# Patient Record
Sex: MALE | HISPANIC OR LATINO | URBAN - METROPOLITAN AREA
[De-identification: names, ages, dates, MRNs, and addresses within clinical notes are randomized per-mention and may not be internally consistent; named-entity substitution may affect disease eponyms.]

---

## 2021-11-25 ENCOUNTER — INPATIENT (INPATIENT)
Facility: HOSPITAL | Age: 41
LOS: 3 days | Discharge: HOME | DRG: 552 | End: 2021-11-29
Attending: INTERNAL MEDICINE | Admitting: HOSPITALIST
Payer: SELF-PAY

## 2021-11-25 VITALS
OXYGEN SATURATION: 94 % | RESPIRATION RATE: 18 BRPM | HEART RATE: 92 BPM | SYSTOLIC BLOOD PRESSURE: 167 MMHG | DIASTOLIC BLOOD PRESSURE: 110 MMHG | TEMPERATURE: 98 F

## 2021-11-25 PROCEDURE — 99285 EMERGENCY DEPT VISIT HI MDM: CPT

## 2021-11-25 RX ORDER — KETOROLAC TROMETHAMINE 30 MG/ML
15 SYRINGE (ML) INJECTION ONCE
Refills: 0 | Status: DISCONTINUED | OUTPATIENT
Start: 2021-11-25 | End: 2021-11-25

## 2021-11-25 RX ORDER — MORPHINE SULFATE 50 MG/1
4 CAPSULE, EXTENDED RELEASE ORAL ONCE
Refills: 0 | Status: DISCONTINUED | OUTPATIENT
Start: 2021-11-25 | End: 2021-11-25

## 2021-11-25 RX ORDER — DEXAMETHASONE 0.5 MG/5ML
6 ELIXIR ORAL ONCE
Refills: 0 | Status: COMPLETED | OUTPATIENT
Start: 2021-11-25 | End: 2021-11-25

## 2021-11-25 RX ORDER — DIAZEPAM 5 MG
5 TABLET ORAL ONCE
Refills: 0 | Status: DISCONTINUED | OUTPATIENT
Start: 2021-11-25 | End: 2021-11-25

## 2021-11-25 RX ORDER — METHOCARBAMOL 500 MG/1
1500 TABLET, FILM COATED ORAL ONCE
Refills: 0 | Status: COMPLETED | OUTPATIENT
Start: 2021-11-25 | End: 2021-11-25

## 2021-11-25 RX ORDER — LIDOCAINE 4 G/100G
1 CREAM TOPICAL ONCE
Refills: 0 | Status: COMPLETED | OUTPATIENT
Start: 2021-11-25 | End: 2021-11-25

## 2021-11-25 RX ADMIN — LIDOCAINE 1 PATCH: 4 CREAM TOPICAL at 21:58

## 2021-11-25 RX ADMIN — METHOCARBAMOL 1500 MILLIGRAM(S): 500 TABLET, FILM COATED ORAL at 21:59

## 2021-11-25 RX ADMIN — MORPHINE SULFATE 4 MILLIGRAM(S): 50 CAPSULE, EXTENDED RELEASE ORAL at 21:59

## 2021-11-25 RX ADMIN — Medication 15 MILLIGRAM(S): at 23:42

## 2021-11-25 RX ADMIN — Medication 5 MILLIGRAM(S): at 23:42

## 2021-11-25 RX ADMIN — Medication 6 MILLIGRAM(S): at 21:59

## 2021-11-25 NOTE — ED PROVIDER NOTE - OBJECTIVE STATEMENT
41 y/o male with PMHx of back pain c/o lower back pain. Pt was given 2 doses of 50mg of Fentanyl by EMS prior to arrival. Pt has had pain for 5 days, one the first day pt states pain was mild and then progressed to the point he could not walk due to the pain. Pt states pain radiates down left leg. Pt denies leg weakness. Pt states in the past he has used back brace and medicine in the past for pain that has worked, but has not worked today. Pt to Naproxen earlier today. Pt works as . Pt has not seen a spine doctor in past. No fevers, chills, recent spinal procedures, bowel/bladder incontinence, IVDU, cancer, > 49 yo, recent weight loss, trauma, weakness, numbness, tingling, dysuria, hematuria

## 2021-11-25 NOTE — ED PROVIDER NOTE - PHYSICAL EXAMINATION
Gen: pt in moderate distress secondary to pain   Head: NC/AT  Neck: trachea midline  Card: regular rate and rhythm  Resp:  CTAB  Abd: soft, non-tender  Ext: no deformities  Back: no midline spinal tenderness, + left para-lumbar TTP, + straight leg raise on left  Neuro:  A&O appears non focal  Skin:  Warm and dry as visualized  Psych:  Normal affect and mood

## 2021-11-25 NOTE — ED ADULT NURSE NOTE - OBJECTIVE STATEMENT
Assumed care of patient in b6r. Pt. a&ox4 rr even and unlabored. Pt. bibems, pt reports having back injury in the past. Pt. received 100mcg of fentanyl total en route. Pt. has spine binder from home, pt reports needing to stay flat or else he has excruciating pain. pt educated on plan of care, pt able to successfully teach back plan of care to RN, RN will continue to reeducate pt during hospital stay.

## 2021-11-25 NOTE — ED PROVIDER NOTE - PROGRESS NOTE DETAILS
Jaime SWAN: patient with continued pain despite multiple medications. Will place in CDU for pain control and MRI.

## 2021-11-25 NOTE — ED PROVIDER NOTE - CLINICAL SUMMARY MEDICAL DECISION MAKING FREE TEXT BOX
Pt with acute on chronic low back pain no red flags plan for pain control, reassess. If improved likely discharge with spine center follow-up

## 2021-11-25 NOTE — ED ADULT TRIAGE NOTE - CHIEF COMPLAINT QUOTE
patient with complaints of severe back pain, states that he has a prior back injury, patient received 100mcg total of fentanyl

## 2021-11-26 DIAGNOSIS — M54.50 LOW BACK PAIN, UNSPECIFIED: ICD-10-CM

## 2021-11-26 LAB
ALBUMIN SERPL ELPH-MCNC: 4.4 G/DL — SIGNIFICANT CHANGE UP (ref 3.3–5.2)
ALP SERPL-CCNC: 156 U/L — HIGH (ref 40–120)
ALT FLD-CCNC: 29 U/L — SIGNIFICANT CHANGE UP
ANION GAP SERPL CALC-SCNC: 14 MMOL/L — SIGNIFICANT CHANGE UP (ref 5–17)
ANISOCYTOSIS BLD QL: SLIGHT — SIGNIFICANT CHANGE UP
APPEARANCE UR: ABNORMAL
AST SERPL-CCNC: 28 U/L — SIGNIFICANT CHANGE UP
BACTERIA # UR AUTO: NEGATIVE — SIGNIFICANT CHANGE UP
BASOPHILS # BLD AUTO: 0 K/UL — SIGNIFICANT CHANGE UP (ref 0–0.2)
BASOPHILS NFR BLD AUTO: 0 % — SIGNIFICANT CHANGE UP (ref 0–2)
BILIRUB SERPL-MCNC: 0.6 MG/DL — SIGNIFICANT CHANGE UP (ref 0.4–2)
BILIRUB UR-MCNC: NEGATIVE — SIGNIFICANT CHANGE UP
BUN SERPL-MCNC: 9.6 MG/DL — SIGNIFICANT CHANGE UP (ref 8–20)
CALCIUM SERPL-MCNC: 9.3 MG/DL — SIGNIFICANT CHANGE UP (ref 8.6–10.2)
CHLORIDE SERPL-SCNC: 100 MMOL/L — SIGNIFICANT CHANGE UP (ref 98–107)
CO2 SERPL-SCNC: 22 MMOL/L — SIGNIFICANT CHANGE UP (ref 22–29)
COLOR SPEC: YELLOW — SIGNIFICANT CHANGE UP
CREAT SERPL-MCNC: 0.64 MG/DL — SIGNIFICANT CHANGE UP (ref 0.5–1.3)
DIFF PNL FLD: NEGATIVE — SIGNIFICANT CHANGE UP
EOSINOPHIL # BLD AUTO: 0 K/UL — SIGNIFICANT CHANGE UP (ref 0–0.5)
EOSINOPHIL NFR BLD AUTO: 0 % — SIGNIFICANT CHANGE UP (ref 0–6)
EPI CELLS # UR: SIGNIFICANT CHANGE UP
GIANT PLATELETS BLD QL SMEAR: PRESENT — SIGNIFICANT CHANGE UP
GLUCOSE SERPL-MCNC: 149 MG/DL — HIGH (ref 70–99)
GLUCOSE UR QL: NEGATIVE MG/DL — SIGNIFICANT CHANGE UP
HCT VFR BLD CALC: 44.2 % — SIGNIFICANT CHANGE UP (ref 39–50)
HGB BLD-MCNC: 15.2 G/DL — SIGNIFICANT CHANGE UP (ref 13–17)
KETONES UR-MCNC: ABNORMAL
LEUKOCYTE ESTERASE UR-ACNC: NEGATIVE — SIGNIFICANT CHANGE UP
LYMPHOCYTES # BLD AUTO: 0.45 K/UL — LOW (ref 1–3.3)
LYMPHOCYTES # BLD AUTO: 4.3 % — LOW (ref 13–44)
MANUAL SMEAR VERIFICATION: SIGNIFICANT CHANGE UP
MCHC RBC-ENTMCNC: 29.3 PG — SIGNIFICANT CHANGE UP (ref 27–34)
MCHC RBC-ENTMCNC: 34.4 GM/DL — SIGNIFICANT CHANGE UP (ref 32–36)
MCV RBC AUTO: 85.3 FL — SIGNIFICANT CHANGE UP (ref 80–100)
MICROCYTES BLD QL: SLIGHT — SIGNIFICANT CHANGE UP
MONOCYTES # BLD AUTO: 0 K/UL — SIGNIFICANT CHANGE UP (ref 0–0.9)
MONOCYTES NFR BLD AUTO: 0 % — LOW (ref 2–14)
NEUTROPHILS # BLD AUTO: 9.56 K/UL — HIGH (ref 1.8–7.4)
NEUTROPHILS NFR BLD AUTO: 92.2 % — HIGH (ref 43–77)
NITRITE UR-MCNC: NEGATIVE — SIGNIFICANT CHANGE UP
PH UR: 6.5 — SIGNIFICANT CHANGE UP (ref 5–8)
PLAT MORPH BLD: NORMAL — SIGNIFICANT CHANGE UP
PLATELET # BLD AUTO: 354 K/UL — SIGNIFICANT CHANGE UP (ref 150–400)
POLYCHROMASIA BLD QL SMEAR: SLIGHT — SIGNIFICANT CHANGE UP
POTASSIUM SERPL-MCNC: 4.3 MMOL/L — SIGNIFICANT CHANGE UP (ref 3.5–5.3)
POTASSIUM SERPL-SCNC: 4.3 MMOL/L — SIGNIFICANT CHANGE UP (ref 3.5–5.3)
PROT SERPL-MCNC: 8.4 G/DL — SIGNIFICANT CHANGE UP (ref 6.6–8.7)
PROT UR-MCNC: 30 MG/DL
RBC # BLD: 5.18 M/UL — SIGNIFICANT CHANGE UP (ref 4.2–5.8)
RBC # FLD: 12.8 % — SIGNIFICANT CHANGE UP (ref 10.3–14.5)
RBC BLD AUTO: NORMAL — SIGNIFICANT CHANGE UP
RBC CASTS # UR COMP ASSIST: NEGATIVE /HPF — SIGNIFICANT CHANGE UP (ref 0–4)
SARS-COV-2 RNA SPEC QL NAA+PROBE: SIGNIFICANT CHANGE UP
SODIUM SERPL-SCNC: 136 MMOL/L — SIGNIFICANT CHANGE UP (ref 135–145)
SP GR SPEC: 1.01 — SIGNIFICANT CHANGE UP (ref 1.01–1.02)
UROBILINOGEN FLD QL: 1 MG/DL
VARIANT LYMPHS # BLD: 3.5 % — SIGNIFICANT CHANGE UP (ref 0–6)
WBC # BLD: 10.37 K/UL — SIGNIFICANT CHANGE UP (ref 3.8–10.5)
WBC # FLD AUTO: 10.37 K/UL — SIGNIFICANT CHANGE UP (ref 3.8–10.5)
WBC UR QL: NEGATIVE — SIGNIFICANT CHANGE UP

## 2021-11-26 PROCEDURE — 72131 CT LUMBAR SPINE W/O DYE: CPT | Mod: 26,MA

## 2021-11-26 PROCEDURE — 99234 HOSP IP/OBS SM DT SF/LOW 45: CPT

## 2021-11-26 PROCEDURE — 99222 1ST HOSP IP/OBS MODERATE 55: CPT

## 2021-11-26 PROCEDURE — 72148 MRI LUMBAR SPINE W/O DYE: CPT | Mod: 26

## 2021-11-26 RX ORDER — HYDROMORPHONE HYDROCHLORIDE 2 MG/ML
1 INJECTION INTRAMUSCULAR; INTRAVENOUS; SUBCUTANEOUS ONCE
Refills: 0 | Status: DISCONTINUED | OUTPATIENT
Start: 2021-11-26 | End: 2021-11-26

## 2021-11-26 RX ORDER — LIDOCAINE 4 G/100G
1 CREAM TOPICAL EVERY 24 HOURS
Refills: 0 | Status: DISCONTINUED | OUTPATIENT
Start: 2021-11-26 | End: 2021-11-29

## 2021-11-26 RX ORDER — OXYCODONE AND ACETAMINOPHEN 5; 325 MG/1; MG/1
1 TABLET ORAL EVERY 6 HOURS
Refills: 0 | Status: DISCONTINUED | OUTPATIENT
Start: 2021-11-26 | End: 2021-11-27

## 2021-11-26 RX ORDER — SENNA PLUS 8.6 MG/1
2 TABLET ORAL AT BEDTIME
Refills: 0 | Status: DISCONTINUED | OUTPATIENT
Start: 2021-11-26 | End: 2021-11-29

## 2021-11-26 RX ORDER — METHOCARBAMOL 500 MG/1
1500 TABLET, FILM COATED ORAL EVERY 6 HOURS
Refills: 0 | Status: DISCONTINUED | OUTPATIENT
Start: 2021-11-26 | End: 2021-11-29

## 2021-11-26 RX ORDER — HYDROMORPHONE HYDROCHLORIDE 2 MG/ML
1 INJECTION INTRAMUSCULAR; INTRAVENOUS; SUBCUTANEOUS EVERY 6 HOURS
Refills: 0 | Status: DISCONTINUED | OUTPATIENT
Start: 2021-11-26 | End: 2021-11-28

## 2021-11-26 RX ADMIN — HYDROMORPHONE HYDROCHLORIDE 1 MILLIGRAM(S): 2 INJECTION INTRAMUSCULAR; INTRAVENOUS; SUBCUTANEOUS at 13:45

## 2021-11-26 RX ADMIN — OXYCODONE AND ACETAMINOPHEN 1 TABLET(S): 5; 325 TABLET ORAL at 11:50

## 2021-11-26 RX ADMIN — LIDOCAINE 1 PATCH: 4 CREAM TOPICAL at 11:09

## 2021-11-26 RX ADMIN — METHOCARBAMOL 1500 MILLIGRAM(S): 500 TABLET, FILM COATED ORAL at 17:42

## 2021-11-26 RX ADMIN — LIDOCAINE 1 PATCH: 4 CREAM TOPICAL at 07:30

## 2021-11-26 RX ADMIN — LIDOCAINE 1 PATCH: 4 CREAM TOPICAL at 09:38

## 2021-11-26 RX ADMIN — HYDROMORPHONE HYDROCHLORIDE 1 MILLIGRAM(S): 2 INJECTION INTRAMUSCULAR; INTRAVENOUS; SUBCUTANEOUS at 03:01

## 2021-11-26 RX ADMIN — OXYCODONE AND ACETAMINOPHEN 1 TABLET(S): 5; 325 TABLET ORAL at 11:09

## 2021-11-26 RX ADMIN — METHOCARBAMOL 1500 MILLIGRAM(S): 500 TABLET, FILM COATED ORAL at 11:09

## 2021-11-26 RX ADMIN — HYDROMORPHONE HYDROCHLORIDE 1 MILLIGRAM(S): 2 INJECTION INTRAMUSCULAR; INTRAVENOUS; SUBCUTANEOUS at 13:23

## 2021-11-26 RX ADMIN — OXYCODONE AND ACETAMINOPHEN 1 TABLET(S): 5; 325 TABLET ORAL at 17:41

## 2021-11-26 RX ADMIN — LIDOCAINE 1 PATCH: 4 CREAM TOPICAL at 23:28

## 2021-11-26 NOTE — H&P ADULT - ASSESSMENT
40M with a history of back pain who presented with increased back pain after lifting a heavy object five days prior.    Back pain - CT of the lumbar spine was without acute fracture of traumatic malalignment. Noted mild disc height lossin L3-4 and L5-S1. Noted moderate to severe right and moderate left neuroforaminal stenosis. Various analgesic agents were administered in the emergency department without significant improvement. To continue on analgesic medications and muscle relaxers for now. Addition of stool softener while on opiate medications. No obvious neurological deficits on examination. Pending MRI for further examination. Pain Management consultation placed. Physical Therapy evaluation attempted but the patient was unable to fully participate due to the pain.

## 2021-11-26 NOTE — ED CDU PROVIDER DISPOSITION NOTE - CLINICAL COURSE
Pt is a 40y M presenting with lower back pain after heavy lifting. CT lumbar spine shows spondylosis at L5-S1. Pt placed in OBS for MRI. Pain management called for consult. Pt still with severe back pain and unable to roll over in bed. Will admit for intractable pain.

## 2021-11-26 NOTE — PROVIDER CONTACT NOTE (OTHER) - RECOMMENDATIONS
D/C Recommendation: home, RW? ambulation/transfers TBA D/C Recommendation: homePT, RW? ambulation/transfers TBA

## 2021-11-26 NOTE — ED CDU PROVIDER INITIAL DAY NOTE - ATTENDING CONTRIBUTION TO CARE
Jaime: I performed a face to face bedside interview with patient regarding history of present illness, review of symptoms and past medical history. I completed an independent physical exam.  I have discussed patient's plan of care with advanced care provider.   I agree with note as stated above including HISTORY OF PRESENT ILLNESS, HIV, PAST MEDICAL/SURGICAL/FAMILY/SOCIAL HISTORY, ALLERGIES AND HOME MEDICATIONS, REVIEW OF SYSTEMS, PHYSICAL EXAM, MEDICAL DECISION MAKING and any PROGRESS NOTES during the time I functioned as the attending physician for this patient  unless otherwise noted. My brief assessment is as follows: Patient with back pain x 4-5 days. Continued back pain despite pain meds. Placed in CDU for pain control and MRI. CT showing "Multilevel lumbar spondylosis most prominent at the L5-S1 level contributing to moderate to severe right and moderate left neuroforaminal stenosis.".

## 2021-11-26 NOTE — ED CDU PROVIDER DISPOSITION NOTE - ATTENDING CONTRIBUTION TO CARE
The patient seen and examined    Low Back Pain    I, Elvis Simpson, performed the initial face to face bedside interview with this patient regarding history of present illness, review of symptoms and relevant past medical, social and family history.  I completed an independent physical examination.  I was the initial provider who evaluated this patient. I have signed out the follow up of any pending tests (i.e. labs, radiological studies) to the ACP.  I have communicated the patient’s plan of care and disposition with the ACP.

## 2021-11-26 NOTE — H&P ADULT - NSHPPHYSICALEXAM_GEN_ALL_CORE
Vital Signs Last 24 Hrs  T(C): 36.7 (26 Nov 2021 11:20), Max: 37.1 (25 Nov 2021 23:44)  T(F): 98.1 (26 Nov 2021 11:20), Max: 98.8 (25 Nov 2021 23:44)  HR: 86 (26 Nov 2021 11:20) (86 - 92)  BP: 126/76 (26 Nov 2021 11:20) (126/76 - 167/110)  BP(mean): --  RR: 18 (26 Nov 2021 11:20) (17 - 18)  SpO2: 96% (26 Nov 2021 11:20) (94% - 96%)    General appearance: No acute distress, Awake, Alert  HEENT: Normocephalic, Atraumatic, Conjunctiva clear, EOMI  Neck: Supple, No JVD, No tenderness  Lungs: Breath sound equal bilaterally, No wheezes, No rales  Cardiovascular: S1S2, Regular rhythm  Abdomen: Soft, Nontender, Nondistended, No guarding/rebound, Positive bowel sounds  Extremities: No clubbing, No cyanosis, No edema, No calf tenderness  Neuro: No tremors, Limited examination due to pain, No numbness or decrease in sensation  Psychiatric: Appropriate mood, Normal affect

## 2021-11-26 NOTE — ED ADULT NURSE REASSESSMENT NOTE - NS ED NURSE REASSESS COMMENT FT1
Pt. continues to c/o of lower back pain after pain medication administration. Jaime SWAN at bedside
to CT
patient sleeping; in NAD. will ctm
care assumed from FRANCO Cisse. AAO x 4.no complaints of pain at present. all questions answered. awaiting MRI in AM. will ctm

## 2021-11-26 NOTE — PHYSICAL THERAPY INITIAL EVALUATION ADULT - LEVEL OF INDEPENDENCE, REHAB EVAL
pt refusing to sit on edge of bed due to back pain despite max encouragement, RN and PA aware/maximum assist (25% patients effort)

## 2021-11-26 NOTE — H&P ADULT - HISTORY OF PRESENT ILLNESS
40M who presented with back pain. 40M who presented with back pain. The patient reported that he worked as a  and hurt his back about five days prior while lifting something heavy. The pain was initially mild in severity but continued to worsen everyday and the patient presented to the hospital when the pain was to the point that he was unable to ambulate. He reported a history of back pain in the past but was never to this severity. He would wear a back brace which had previously helped but provided no relief on this occasion. The patient was evaluated in the emergency department and initially placed under observational status. Various analgesic medications were administered without significant improvement and the patient continued to have significant back pain and was thought to require admission to the hospital for further management. The patient reported that he was otherwise in his usual stated of health and was without fevers, chills, cough, or recent sick contacts.

## 2021-11-26 NOTE — PROVIDER CONTACT NOTE (OTHER) - ASSESSMENT
PT ordered. chart reviewed and noted. pt received in ED, semi landrum position in stretcher, agreeable to PT. pt requires assistance for functional mobility due to pain. back pain pre/post session: 10/10, RN and PA aware of pain and function. pt left as received will continue to follow, NAD, all lines intact  goals: 3-5x a week for 1 week  bed mobility: modified I   transfers: modified I   gait: 50 feet RW modified I

## 2021-11-26 NOTE — ED CDU PROVIDER INITIAL DAY NOTE - OBJECTIVE STATEMENT
40 year old male with pmhx of back pain presents c/o back pain. Pt states he has had back pain for "many years", he has always worked with cards, occasional heavy lifting, no initial trauma/injury he can recall. he has flare up occasionally usually controlled with tylenol.  he admits to heavy lifting this past Wednesday, Sunday began having low back pain, radiating down left leg. admits to tingling. he has not been able to walk since. He was given fentanyl 50mg 2x in EMS, decadron, morphine, robaxin, valium here in ED. He still c/o severe back pain. Denies fever/chills, incontinence, recent trauma/injury, n/v/d, abdominal pain, flank pain, sob, chest pain.

## 2021-11-27 LAB
COVID-19 NUCLEOCAPSID GAM AB INTERP: NEGATIVE — SIGNIFICANT CHANGE UP
COVID-19 NUCLEOCAPSID TOTAL GAM ANTIBODY RESULT: 0.3 INDEX — SIGNIFICANT CHANGE UP
COVID-19 SPIKE DOMAIN AB INTERP: POSITIVE
COVID-19 SPIKE DOMAIN ANTIBODY RESULT: >250 U/ML — HIGH
SARS-COV-2 IGG+IGM SERPL QL IA: 0.3 INDEX — SIGNIFICANT CHANGE UP
SARS-COV-2 IGG+IGM SERPL QL IA: >250 U/ML — HIGH
SARS-COV-2 IGG+IGM SERPL QL IA: NEGATIVE — SIGNIFICANT CHANGE UP
SARS-COV-2 IGG+IGM SERPL QL IA: POSITIVE

## 2021-11-27 PROCEDURE — 99252 IP/OBS CONSLTJ NEW/EST SF 35: CPT

## 2021-11-27 PROCEDURE — 99253 IP/OBS CNSLTJ NEW/EST LOW 45: CPT

## 2021-11-27 PROCEDURE — 99233 SBSQ HOSP IP/OBS HIGH 50: CPT

## 2021-11-27 RX ORDER — OXYCODONE HYDROCHLORIDE 5 MG/1
5 TABLET ORAL EVERY 4 HOURS
Refills: 0 | Status: DISCONTINUED | OUTPATIENT
Start: 2021-11-27 | End: 2021-11-29

## 2021-11-27 RX ORDER — ACETAMINOPHEN 500 MG
975 TABLET ORAL EVERY 8 HOURS
Refills: 0 | Status: DISCONTINUED | OUTPATIENT
Start: 2021-11-27 | End: 2021-11-29

## 2021-11-27 RX ORDER — DEXAMETHASONE 0.5 MG/5ML
10 ELIXIR ORAL ONCE
Refills: 0 | Status: COMPLETED | OUTPATIENT
Start: 2021-11-27 | End: 2021-11-27

## 2021-11-27 RX ORDER — HEPARIN SODIUM 5000 [USP'U]/ML
5000 INJECTION INTRAVENOUS; SUBCUTANEOUS EVERY 8 HOURS
Refills: 0 | Status: DISCONTINUED | OUTPATIENT
Start: 2021-11-27 | End: 2021-11-29

## 2021-11-27 RX ADMIN — HYDROMORPHONE HYDROCHLORIDE 1 MILLIGRAM(S): 2 INJECTION INTRAMUSCULAR; INTRAVENOUS; SUBCUTANEOUS at 08:48

## 2021-11-27 RX ADMIN — METHOCARBAMOL 1500 MILLIGRAM(S): 500 TABLET, FILM COATED ORAL at 00:33

## 2021-11-27 RX ADMIN — OXYCODONE AND ACETAMINOPHEN 1 TABLET(S): 5; 325 TABLET ORAL at 05:17

## 2021-11-27 RX ADMIN — OXYCODONE HYDROCHLORIDE 5 MILLIGRAM(S): 5 TABLET ORAL at 23:32

## 2021-11-27 RX ADMIN — OXYCODONE AND ACETAMINOPHEN 1 TABLET(S): 5; 325 TABLET ORAL at 00:33

## 2021-11-27 RX ADMIN — METHOCARBAMOL 1500 MILLIGRAM(S): 500 TABLET, FILM COATED ORAL at 17:25

## 2021-11-27 RX ADMIN — OXYCODONE AND ACETAMINOPHEN 1 TABLET(S): 5; 325 TABLET ORAL at 03:54

## 2021-11-27 RX ADMIN — HYDROMORPHONE HYDROCHLORIDE 1 MILLIGRAM(S): 2 INJECTION INTRAMUSCULAR; INTRAVENOUS; SUBCUTANEOUS at 15:31

## 2021-11-27 RX ADMIN — LIDOCAINE 1 PATCH: 4 CREAM TOPICAL at 11:53

## 2021-11-27 RX ADMIN — Medication 975 MILLIGRAM(S): at 18:07

## 2021-11-27 RX ADMIN — HYDROMORPHONE HYDROCHLORIDE 1 MILLIGRAM(S): 2 INJECTION INTRAMUSCULAR; INTRAVENOUS; SUBCUTANEOUS at 15:50

## 2021-11-27 RX ADMIN — OXYCODONE AND ACETAMINOPHEN 1 TABLET(S): 5; 325 TABLET ORAL at 03:43

## 2021-11-27 RX ADMIN — HEPARIN SODIUM 5000 UNIT(S): 5000 INJECTION INTRAVENOUS; SUBCUTANEOUS at 23:32

## 2021-11-27 RX ADMIN — Medication 10 MILLIGRAM(S): at 18:12

## 2021-11-27 RX ADMIN — SENNA PLUS 2 TABLET(S): 8.6 TABLET ORAL at 23:32

## 2021-11-27 RX ADMIN — HYDROMORPHONE HYDROCHLORIDE 1 MILLIGRAM(S): 2 INJECTION INTRAMUSCULAR; INTRAVENOUS; SUBCUTANEOUS at 03:55

## 2021-11-27 RX ADMIN — METHOCARBAMOL 1500 MILLIGRAM(S): 500 TABLET, FILM COATED ORAL at 11:53

## 2021-11-27 RX ADMIN — OXYCODONE HYDROCHLORIDE 5 MILLIGRAM(S): 5 TABLET ORAL at 17:25

## 2021-11-27 RX ADMIN — HYDROMORPHONE HYDROCHLORIDE 1 MILLIGRAM(S): 2 INJECTION INTRAMUSCULAR; INTRAVENOUS; SUBCUTANEOUS at 09:11

## 2021-11-27 RX ADMIN — HEPARIN SODIUM 5000 UNIT(S): 5000 INJECTION INTRAVENOUS; SUBCUTANEOUS at 15:31

## 2021-11-27 RX ADMIN — LIDOCAINE 1 PATCH: 4 CREAM TOPICAL at 03:43

## 2021-11-27 RX ADMIN — OXYCODONE AND ACETAMINOPHEN 1 TABLET(S): 5; 325 TABLET ORAL at 11:52

## 2021-11-27 RX ADMIN — OXYCODONE AND ACETAMINOPHEN 1 TABLET(S): 5; 325 TABLET ORAL at 12:52

## 2021-11-27 RX ADMIN — HYDROMORPHONE HYDROCHLORIDE 1 MILLIGRAM(S): 2 INJECTION INTRAMUSCULAR; INTRAVENOUS; SUBCUTANEOUS at 02:48

## 2021-11-27 RX ADMIN — METHOCARBAMOL 1500 MILLIGRAM(S): 500 TABLET, FILM COATED ORAL at 23:32

## 2021-11-27 RX ADMIN — METHOCARBAMOL 1500 MILLIGRAM(S): 500 TABLET, FILM COATED ORAL at 05:18

## 2021-11-27 NOTE — CONSULT NOTE ADULT - ASSESSMENT
40M w/ no significant PMH comes in c/o of intractable lower back pain that radiates down his left leg x 5 days prior to admission.  Pt has had back discomfort in the past but never like this.  Pain started after lifting heavy object.  He denies urinary/fecal.    40 year old Upper sorbian speaking Male ( used bedside,[id number]) w/ no PMHX, does not have PCP, presents to ED after worsening back pain, mostly in L lumbar/buttock region, radiating down L posterior aspect of thigh, stopping at knee. While lifting a heavy object ~1 week ago, he started having this back pain. Hx of back pain but unlike current episode, has never seen doctor regarding pain, purchased back brace OTC. Denies bladder/bowel incontinence, saddle anesthesia, headache, nausea, vomiting. Neuro exam intact, however LLE + straight leg raise. Pt preferring prone for comfort.         Plan  - Imaging reviewed  - Pain control PRN; Tylenol 650q6, Oxy 5/10q4  - Decadron 10 x1 for pain comfort  - Pain management  - D/w Dr. Salgado to see in AM for further plan  40 year old [place] speaking Male ( used bedside,[id number]) w/ no PMHX, does not have PCP, presents to ED after worsening back pain, mostly in L lumbar/buttock region, radiating down L posterior aspect of thigh, stopping at knee. While lifting a heavy object ~1 week ago, he started having this back pain. Hx of back pain but unlike current episode, has never seen doctor regarding pain, purchased back brace OTC. Denies bladder/bowel incontinence, saddle anesthesia, headache, nausea, vomiting. Neuro exam intact, however LLE + straight leg raise. Pt preferring prone for comfort.         Plan  - Imaging reviewed  - Q4 neuro checks while in house, monitor for any weakness   - Pain control PRN; Tylenol 650q6, Oxy 5/10q4  - Decadron 10 x1 for pain comfort  - Pain management  - D/w Dr. Salgado to see in AM for further plan

## 2021-11-27 NOTE — PROGRESS NOTE ADULT - ASSESSMENT
39 y/o Marshallese speaking M with no significant PMH comes in c/o of intractable lower back pain that radiates down his left leg x 5 days prior to admission.  Pt has had back discomfort in the past but never like this.  Pain started after lifting heavy object.  He denies urinary/fecal.       Intractable back pain   - CT lumbar spine reviewed and noted above; significant for multilevel lumbar spondylosis most prominent at L5-S1 contributing to mod/severe rt and mod L-neuroforaminal stenosis    - MRI lumbar spine reviewed and noted above; significant for left L4-5 disc herniation w/ underlying degenerative changes and left recess impingement w/ underlying canal stenosis    - PRN analgesics and muscle relaxers   - PRN stool softeners while on opiate regimen   - PT consult will be ordered once Ortho clears and pain is better controlled   - Pain managment following   - In light of MRI findings, Ortho consulted for further recs      VTE ppx: heparin SQ    Dispo: pending clinical course.

## 2021-11-27 NOTE — PROGRESS NOTE ADULT - SUBJECTIVE AND OBJECTIVE BOX
Chief Complaint:  back pain    SUBJECTIVE / OVERNIGHT EVENTS: No acute overnight events reported.  Pt continues to require prn pain meds for pain control.  Pain radiates down lower back to proximal LLE up to knee.  Patient denies paresthesias, fecal/urinary incontinence, inability to move extremities.  He also denies chest pain, SOB, abd pain, N/V, fever, chills, dysuria or any other complaints. All remainder ROS negative.       I&O's Summary        PHYSICAL EXAM:  Vital Signs Last 24 Hrs  T(C): 36.5 (2021 06:41), Max: 36.7 (2021 11:20)  T(F): 97.7 (2021 06:41), Max: 98.1 (2021 11:20)  HR: 78 (2021 06:41) (68 - 86)  BP: 136/86 (2021 06:41) (124/79 - 136/86)  BP(mean): --  RR: 16 (2021 06:41) (16 - 18)  SpO2: 97% (2021 06:41) (92% - 97%)      GENEARAL: pt examined bedside, appears uncomfortable in bed but in NAD  HEENT: NC/AT, moist oral mucosa, clear conjunctiva, sclera nonicteric  RESPIRATORY: Normal respiratory effort; CTA b/l, no wheezing, rhonchi, rales  CARDIOVASCULAR: RRR, normal S1 and S2, no murmur/rub/gallop  ABDOMEN: soft, NT/ND, normoactive bowel sounds, no rebound/guarding  MUSCLOSKELETAL:  no joint swelling or tenderness to palpation, unable to do straight leg test on left 2/2 pain, no spinal or paraspinal tenderness to palpation  EXTREMITIES: No cynaosis, no clubbing, no lower extremity edema; Peripheral pulses are 2+ bilaterally  PSYCH: affect appropriate and cooperative  NEUROLOGY: A+O to person, place, and time, no focal deficits appreciated  SKIN: No rashes or no palpable lesions        LABS:                        15.2   10.37 )-----------( 354      ( 2021 03:11 )             44.2     11-    136  |  100  |  9.6  ----------------------------<  149<H>  4.3   |  22.0  |  0.64    Ca    9.3      2021 03:11    TPro  8.4  /  Alb  4.4  /  TBili  0.6  /  DBili  x   /  AST  28  /  ALT  29  /  AlkPhos  156<H>            Urinalysis Basic - ( 2021 09:56 )    Color: Yellow / Appearance: Slightly Turbid / S.015 / pH: x  Gluc: x / Ketone: Moderate  / Bili: Negative / Urobili: 1 mg/dL   Blood: x / Protein: 30 mg/dL / Nitrite: Negative   Leuk Esterase: Negative / RBC: Negative /HPF / WBC Negative   Sq Epi: x / Non Sq Epi: Occasional / Bacteria: Negative        CAPILLARY BLOOD GLUCOSE            RADIOLOGY & ADDITIONAL TESTS:      < from: CT Lumbar Spine No Cont (21 @ 04:59) >  FINDINGS:    There is no acute lumbar spine fracture or evidence of traumatic malalignment. Preservation of the normal lumbar lordosis. Vertebral body hiatus are well-preserved. Mild intervertebral body disc height lossat L3-L4 and L5-S1. No aggressive osseous lesion. Visualized intra-abdominal contents are unremarkable. Paraspinal musculature is within normal limits.    Evaluation of the individual spinal levels demonstrate:    T12-L1: No disc herniation, spinal canal, or neuroforaminal compromise.    L1-L2: No disc herniation, spinal canal, or neuroforaminal compromise.    L2-L3: No disc herniation, spinal canal, or neuroforaminal compromise.    L3-L4: Diffuse disc bulge with superimposed osseous ridging. Mild left neuroforaminal narrowing. Mild canal stenosis.    L4-L5: Mild diffuse disc bulge. No significant neuroforaminal stenosis. Mild canal stenosis.    L5-S1: Diffuse disc bulge with superimposed osseous ridging contributing to moderate to severe right and moderate left neuroforaminal stenosis.    IMPRESSION:    No acute lumbar spine fracture or evidence of traumatic malalignment.    Multilevel lumbar spondylosis most prominent at the L5-S1 level contributing to moderate to severe right and moderate left neuroforaminal stenosis.    < end of copied text >        < from: MR Lumbar Spine No Cont (21 @ 18:30) >  FINDINGS:  ALIGNMENT:  Mild straightening is noted. Also there is a retrolisthesis at L4-5  VERTEBRAL BODIES:  No acute fracture or destructive lesion is identified.  DISC SPACES: Disc degeneration and disc space narrowing is noted from L3 to S1.  Lower Thoracic: An asymmetric bulge is noted at T11-T12 greater to the left.  L1-2:   Unremarkable  L2-3:   Unremarkable  L3-4:   A small broad-based disc bulge is noted more prevalent to the left. There is mild left foraminal prolapse  L4-5:  A left paracentral and posterolateral herniation is noted with an extruded fragment in the left ventral canal. There is left recess impingement.  L5-S1:   A small protrusion is noted containing within the ventral epidural fat. There is underlying slight retrolisthesis at this level as well.  POSTERIOR ELEMENTS:  Hypertrophic changes are noted at L4-5 and L5-S1  CANAL AND FORAMINA:  Most narrowed at L4-5 left greater than right  INTRADURAL SPACE:  No intradural abnormality.  The distal cord is unremarkable in contour and signal.  MISCELLANEOUS:     The prevertebral and paraspinal soft tissues are unremarkable.    IMPRESSION:      1. Moderate size extrusion indicative of a disc herniation to the left at L4-5 with underlying degenerative changes. Left recess impingement with underlying canal stenosis.  2. Bulging discs also noted at L3-4 and L5-S1 with degenerative changes.  3. No acute fracture identified.    < end of copied text >      MEDICATIONS  (STANDING):  lidocaine   4% Patch 1 Patch Transdermal every 24 hours  methocarbamol 1500 milliGRAM(s) Oral every 6 hours  oxycodone    5 mG/acetaminophen 325 mG 1 Tablet(s) Oral every 6 hours  senna 2 Tablet(s) Oral at bedtime    MEDICATIONS  (PRN):  HYDROmorphone  Injectable 1 milliGRAM(s) IV Push every 6 hours PRN Severe Pain (7 - 10)

## 2021-11-27 NOTE — CONSULT NOTE ADULT - SUBJECTIVE AND OBJECTIVE BOX
Patient is a 40y old  Male who presents with a chief complaint of intractable back pain (2021 09:46)    HPI:  40M who presented with back pain. The patient reported that he worked as a  and hurt his back about five days prior while lifting something heavy. The pain was initially mild in severity but continued to worsen everyday and the patient presented to the hospital when the pain was to the point that he was unable to ambulate. He reported a history of back pain in the past but was never to this severity. He would wear a back brace which had previously helped but provided no relief on this occasion. The patient was evaluated in the emergency department and initially placed under observational status. Various analgesic medications were administered without significant improvement and the patient continued to have significant back pain and was thought to require admission to the hospital for further management. The patient reported that he was otherwise in his usual stated of health and was without fevers, chills, cough, or recent sick contacts. (2021 15:29)      HISTORY OF PRESENT ILLNESS:   40yM PMH     PAST MEDICAL & SURGICAL HISTORY:  Back pain    No significant past surgical history      FAMILY HISTORY:  No pertinent family history in first degree relatives        SOCIAL HISTORY:  Tobacco Use:  EtOH use:   Substance:    Allergies    No Known Allergies    Intolerances        REVIEW OF SYSTEMS  Negative except as noted in HPI  CONSTITUTIONAL: No fever, weight loss, or fatigue  EYES: No eye pain, visual disturbances, or discharge  ENMT:  No difficulty hearing, tinnitus, vertigo; No sinus or throat pain  NECK: No pain or stiffness  BREASTS: No pain, masses, or nipple discharge  RESPIRATORY: No cough, wheezing, chills or hemoptysis; No shortness of breath  CARDIOVASCULAR: No chest pain, palpitations, dizziness, or leg swelling  GASTROINTESTINAL: No abdominal or epigastric pain. No nausea, vomiting, or hematemesis; No diarrhea or constipation. No melena or hematochezia.  GENITOURINARY: No dysuria, frequency, hematuria, or incontinence  NEUROLOGICAL: No headaches, memory loss, loss of strength, numbness, or tremors  SKIN: No itching, burning, rashes, or lesions   LYMPH NODES: No enlarged glands  ENDOCRINE: No heat or cold intolerance; No hair loss  MUSCULOSKELETAL: No joint pain or swelling; No muscle, back, or extremity pain  PSYCHIATRIC: No depression, anxiety, mood swings, or difficulty sleeping  HEME/LYMPH: No easy bruising, or bleeding gums  ALLERY AND IMMUNOLOGIC: No hives or eczema    HOME MEDICATIONS:  Home Medications:    MEDICATIONS:  Antibiotics:    Neuro:  HYDROmorphone  Injectable 1 milliGRAM(s) IV Push every 6 hours PRN  methocarbamol 1500 milliGRAM(s) Oral every 6 hours  oxycodone    5 mG/acetaminophen 325 mG 1 Tablet(s) Oral every 6 hours    Anticoagulation:  heparin   Injectable 5000 Unit(s) SubCutaneous every 8 hours    OTHER:  lidocaine   4% Patch 1 Patch Transdermal every 24 hours  senna 2 Tablet(s) Oral at bedtime    IVF:      Vital Signs Last 24 Hrs  T(C): 36.5 (2021 06:41), Max: 36.7 (2021 11:20)  T(F): 97.7 (2021 06:41), Max: 98.1 (2021 11:20)  HR: 78 (2021 06:41) (68 - 86)  BP: 136/86 (2021 06:41) (124/79 - 136/86)  BP(mean): --  RR: 16 (2021 06:41) (16 - 18)  SpO2: 97% (2021 06:41) (92% - 97%)      PHYSICAL EXAM:  GENERAL: NAD, well-groomed, well-developed  HEAD:  Atraumatic, normocephalic  DRAINS:   WOUND: Dressing clean dry intact; well healed  SHUNT: easily compressible and refills  EYES: Conjunctiva and sclera clear; corneal reflex intact  ENMT: No tonsillar erythema, exudates, or enlargement; moist mucous membranes, good dentition, no lesions  NECK: Supple, no JVD, dormal thyroid  DANNI COMA SCORE: E- V- M- =       E: 4= opens eyes spontaneously 3= to voice 2= to noxious 1= no opening       V: 5= oriented 4= confused 3= inappropriate words 2= incomprehensible sounds 1= nonverbal 1T= intubated       M: 6= follows commands 5= localizes 4= withdraws 3= flexor posturing 2= extensor posturing 1= no movement  MENTAL STATUS: AAO x3; Awake/Comatose; Opens eyes spontaneously/to voice/to light touch/to noxious stimuli; Appropriately conversant without aphasia/Nonverbal; following simple commands/mimicking/not following commands  CRANIAL NERVES: Visual acuity normal for age, visual fields full to confrontation, PERRL. EOMI without nystagmus. Facial sensation intact V1-3 distribution b/l. Face symmetric w/ normal eye closure and smile, tongue midline. Hearing grossly intact. Speech clear. Head turning and shoulder shrug intact.   REFLEXES: PERRL. Corneals intact b/l. Gag intact. Cough intact. Oculocephalic reflex intact (Doll's eye). Negative Bella's b/l. Negative clonus b/l  MOTOR: strength 5/5 b/l upper and lower extremities  Uppers     Delt (C5/6)     Bicep (C5/6)     Wrist Extend (C6)     Tricep (C7)     HG (C8/T1)  R                     5/5                 5/5                         5/5                           5/5                   5/5  L                      5/5                 5/5                         5/5                           5/5                   5/5  Lowers      HF(L1/L2)     KE (L3)     DF (L4)     EHL (L5)     PF (S1)      R                     5/5              5/5           5/5           5/5            5/5  L                     5/5               5/5          5/5            5/5            5/5  SENSATION: grossly intact to light touch all extremities  COORDINATION: Gait intact; rapid alternating movements intact b/l upper extremities; no upper extremity dysmetria  MUSCLE STRETCH REFLEXES: DTRs 2+ intact and symmetric  PLANTAR: upgoing/downgoing/mute (Babinski)  CHEST/LUNG: Clear to auscultation bilaterally; no rales, rhonchi, wheezing, or rubs  HEART: +S1/+S2; Regular rate and rhythm; no murmurs, rubs, or gallops  ABDOMEN: Soft, nontender, nondistended; bowel sounds present all four quadrants  EXTREMITIES:  2+ peripheral pulses, no clubbing, cyanosis, or edema  LYMPH: No lymphadenopathy noted  SKIN: Warm, dry; no rashes or lesions    LABS:             15.2   10.37 )-----------( 354      ( 2021 03:11 )             44.2         136  |  100  |  9.6  ----------------------------<  149<H>  4.3   |  22.0  |  0.64    Ca    9.3      2021 03:11    TPro  8.4  /  Alb  4.4  /  TBili  0.6  /  DBili  x   /  AST  28  /  ALT  29  /  AlkPhos  156<H>  -    Urinalysis Basic - ( 2021 09:56 )    Color: Yellow / Appearance: Slightly Turbid / S.015 / pH: x  Gluc: x / Ketone: Moderate  / Bili: Negative / Urobili: 1 mg/dL   Blood: x / Protein: 30 mg/dL / Nitrite: Negative   Leuk Esterase: Negative / RBC: Negative /HPF / WBC Negative   Sq Epi: x / Non Sq Epi: Occasional / Bacteria: Negative      CULTURES:        RADIOLOGY & ADDITIONAL STUDIES:  CT Lumbar Spine No Cont (21 @ 04:59)   IMPRESSION:  No acute lumbar spine fracture or evidence of traumatic malalignment.  Multilevel lumbar spondylosis most prominent at the L5-S1 level contributing to moderate to severe right and moderate left neuroforaminal stenosis.    MR Lumbar Spine No Cont (21 @ 18:30)   IMPRESSION:  1. Moderate size extrusion indicative of a disc herniation to the left at L4-5 with underlying degenerative changes. Left recess impingement with underlying canal stenosis.  2. Bulging discs also noted at L3-4 and L5-S1 with degenerative changes.  3. No acute fracture identified.         40 year old Male who presents with a chief complaint of intractable back pain (2021 09:46)    HPI:  40M who presented with back pain. The patient reported that he worked as a  and hurt his back about five days prior while lifting something heavy. The pain was initially mild in severity but continued to worsen everyday and the patient presented to the hospital when the pain was to the point that he was unable to ambulate. He reported a history of back pain in the past but was never to this severity. He would wear a back brace which had previously helped but provided no relief on this occasion. The patient was evaluated in the emergency department and initially placed under observational status. Various analgesic medications were administered without significant improvement and the patient continued to have significant back pain and was thought to require admission to the hospital for further management. The patient reported that he was otherwise in his usual stated of health and was without fevers, chills, cough, or recent sick contacts. (2021 15:29)      HISTORY OF PRESENT ILLNESS:   40 year old Lithuanian speaking Male ( used bedside,[id number]) w/ no PMHX, does not have PCP, presents to ED after worsening back pain, mostly in L lumbar/buttock region, radiating down L posterior aspect of thigh, stopping at knee. While lifting a heavy object ~1 week ago, he started having this back pain. Hx of back pain but unlike current episode, has never seen doctor regarding pain, purchased back brace OTC. Denies bladder/bowel incontinence, saddle anesthesia, headache, nausea, vomiting. Urinating fine, has not had BM since  pt states he hasn't had the appetite d/t severe pain. Neuro exam intact, however LLE + straight leg raise. Pt preferring prone for comfort.     PAST MEDICAL & SURGICAL HISTORY:  Back pain  No significant past surgical history      FAMILY HISTORY:  No pertinent family history in first degree relatives    SOCIAL HISTORY:  Tobacco Use: occasional   EtOH use: occasional   Substance: denies    Allergies  No Known Allergies    REVIEW OF SYSTEMS  Negative except as noted in HPI      HOME MEDICATIONS:  Home Medications:    MEDICATIONS:  Antibiotics:    Neuro:  HYDROmorphone  Injectable 1 milliGRAM(s) IV Push every 6 hours PRN  methocarbamol 1500 milliGRAM(s) Oral every 6 hours  oxycodone    5 mG/acetaminophen 325 mG 1 Tablet(s) Oral every 6 hours    Anticoagulation:  heparin   Injectable 5000 Unit(s) SubCutaneous every 8 hours    OTHER:  lidocaine   4% Patch 1 Patch Transdermal every 24 hours  senna 2 Tablet(s) Oral at bedtime    IVF:      Vital Signs Last 24 Hrs  T(C): 36.5 (2021 06:41), Max: 36.7 (2021 11:20)  T(F): 97.7 (2021 06:41), Max: 98.1 (2021 11:20)  HR: 78 (2021 06:41) (68 - 86)  BP: 136/86 (2021 06:41) (124/79 - 136/86)  BP(mean): --  RR: 16 (2021 06:41) (16 - 18)  SpO2: 97% (2021 06:41) (92% - 97%)      PHYSICAL EXAM:  GENERAL: NAD, well-groomed, well-developed  HEAD:  Atraumatic, normocephalic  DANNI COMA SCORE: E-4 V-5 M-6 = 15       E: 4= opens eyes spontaneously 3= to voice 2= to noxious 1= no opening       V: 5= oriented 4= confused 3= inappropriate words 2= incomprehensible sounds 1= nonverbal 1T= intubated       M: 6= follows commands 5= localizes 4= withdraws 3= flexor posturing 2= extensor posturing 1= no movement  MENTAL STATUS: AAO x3; Awake; Opens eyes spontaneously. Appropriately conversant without aphasia; following simple commands  CRANIAL NERVES: Visual acuity normal for age, EOMI without nystagmus. Face symmetric w/ normal eye closure and smile, tongue midline. Hearing grossly intact. Speech clear.   MOTOR: strength 5/5 b/l upper and lower extremities. LLE HF 5/5 however, + straight leg raise  Uppers     Delt (C5/6)     Bicep (C5/6)     Wrist Extend (C6)     Tricep (C7)     HG (C8/T1)  R                     5/5                 5/5                         5/5                           5/5                   5/5  L                      5/5                 5/5                         5/5                           5/5                   5/5  Lowers      HF(L1/L2)     KE (L3)     DF (L4)     EHL (L5)     PF (S1)      R                     5/5              5/5           5/5           5/5            5/5  L                     5/5               5/5          5/5            5/5            5/5  SENSATION: grossly intact to light touch all extremities  MUSCLE STRETCH REFLEXES: DTRs 2+ intact and symmetric in b/l LE.   CHEST/LUNG: Nonlabored breaths  SKIN: Warm, dry      LABS:             15.2   10.37 )-----------( 354      ( 2021 03:11 )             44.2         136  |  100  |  9.6  ----------------------------<  149<H>  4.3   |  22.0  |  0.64    Ca    9.3      2021 03:11    TPro  8.4  /  Alb  4.4  /  TBili  0.6  /  DBili  x   /  AST  28  /  ALT  29  /  AlkPhos  156<H>      Urinalysis Basic - ( 2021 09:56 )    Color: Yellow / Appearance: Slightly Turbid / S.015 / pH: x  Gluc: x / Ketone: Moderate  / Bili: Negative / Urobili: 1 mg/dL   Blood: x / Protein: 30 mg/dL / Nitrite: Negative   Leuk Esterase: Negative / RBC: Negative /HPF / WBC Negative   Sq Epi: x / Non Sq Epi: Occasional / Bacteria: Negative      CULTURES:        RADIOLOGY & ADDITIONAL STUDIES:  CT Lumbar Spine No Cont (21 @ 04:59)   IMPRESSION:  No acute lumbar spine fracture or evidence of traumatic malalignment.  Multilevel lumbar spondylosis most prominent at the L5-S1 level contributing to moderate to severe right and moderate left neuroforaminal stenosis.    MR Lumbar Spine No Cont (21 @ 18:30)   IMPRESSION:  1. Moderate size extrusion indicative of a disc herniation to the left at L4-5 with underlying degenerative changes. Left recess impingement with underlying canal stenosis.  2. Bulging discs also noted at L3-4 and L5-S1 with degenerative changes.  3. No acute fracture identified.

## 2021-11-28 LAB
ANION GAP SERPL CALC-SCNC: 16 MMOL/L — SIGNIFICANT CHANGE UP (ref 5–17)
BUN SERPL-MCNC: 13.5 MG/DL — SIGNIFICANT CHANGE UP (ref 8–20)
CALCIUM SERPL-MCNC: 9.4 MG/DL — SIGNIFICANT CHANGE UP (ref 8.6–10.2)
CHLORIDE SERPL-SCNC: 98 MMOL/L — SIGNIFICANT CHANGE UP (ref 98–107)
CO2 SERPL-SCNC: 23 MMOL/L — SIGNIFICANT CHANGE UP (ref 22–29)
CREAT SERPL-MCNC: 0.73 MG/DL — SIGNIFICANT CHANGE UP (ref 0.5–1.3)
GLUCOSE SERPL-MCNC: 114 MG/DL — HIGH (ref 70–99)
HCT VFR BLD CALC: 44.6 % — SIGNIFICANT CHANGE UP (ref 39–50)
HGB BLD-MCNC: 15 G/DL — SIGNIFICANT CHANGE UP (ref 13–17)
MCHC RBC-ENTMCNC: 29 PG — SIGNIFICANT CHANGE UP (ref 27–34)
MCHC RBC-ENTMCNC: 33.6 GM/DL — SIGNIFICANT CHANGE UP (ref 32–36)
MCV RBC AUTO: 86.1 FL — SIGNIFICANT CHANGE UP (ref 80–100)
PLATELET # BLD AUTO: 412 K/UL — HIGH (ref 150–400)
POTASSIUM SERPL-MCNC: 4.5 MMOL/L — SIGNIFICANT CHANGE UP (ref 3.5–5.3)
POTASSIUM SERPL-SCNC: 4.5 MMOL/L — SIGNIFICANT CHANGE UP (ref 3.5–5.3)
RBC # BLD: 5.18 M/UL — SIGNIFICANT CHANGE UP (ref 4.2–5.8)
RBC # FLD: 12.5 % — SIGNIFICANT CHANGE UP (ref 10.3–14.5)
SODIUM SERPL-SCNC: 137 MMOL/L — SIGNIFICANT CHANGE UP (ref 135–145)
WBC # BLD: 9.51 K/UL — SIGNIFICANT CHANGE UP (ref 3.8–10.5)
WBC # FLD AUTO: 9.51 K/UL — SIGNIFICANT CHANGE UP (ref 3.8–10.5)

## 2021-11-28 PROCEDURE — 99232 SBSQ HOSP IP/OBS MODERATE 35: CPT

## 2021-11-28 PROCEDURE — 99233 SBSQ HOSP IP/OBS HIGH 50: CPT

## 2021-11-28 RX ORDER — POLYETHYLENE GLYCOL 3350 17 G/17G
17 POWDER, FOR SOLUTION ORAL
Refills: 0 | Status: DISCONTINUED | OUTPATIENT
Start: 2021-11-28 | End: 2021-11-29

## 2021-11-28 RX ORDER — GABAPENTIN 400 MG/1
300 CAPSULE ORAL THREE TIMES A DAY
Refills: 0 | Status: DISCONTINUED | OUTPATIENT
Start: 2021-11-28 | End: 2021-11-29

## 2021-11-28 RX ORDER — HYDROMORPHONE HYDROCHLORIDE 2 MG/ML
1 INJECTION INTRAMUSCULAR; INTRAVENOUS; SUBCUTANEOUS EVERY 6 HOURS
Refills: 0 | Status: DISCONTINUED | OUTPATIENT
Start: 2021-11-28 | End: 2021-11-29

## 2021-11-28 RX ADMIN — OXYCODONE HYDROCHLORIDE 5 MILLIGRAM(S): 5 TABLET ORAL at 15:13

## 2021-11-28 RX ADMIN — Medication 975 MILLIGRAM(S): at 18:19

## 2021-11-28 RX ADMIN — SENNA PLUS 2 TABLET(S): 8.6 TABLET ORAL at 21:38

## 2021-11-28 RX ADMIN — GABAPENTIN 300 MILLIGRAM(S): 400 CAPSULE ORAL at 13:38

## 2021-11-28 RX ADMIN — OXYCODONE HYDROCHLORIDE 5 MILLIGRAM(S): 5 TABLET ORAL at 18:08

## 2021-11-28 RX ADMIN — Medication 975 MILLIGRAM(S): at 10:19

## 2021-11-28 RX ADMIN — OXYCODONE HYDROCHLORIDE 5 MILLIGRAM(S): 5 TABLET ORAL at 00:15

## 2021-11-28 RX ADMIN — METHOCARBAMOL 1500 MILLIGRAM(S): 500 TABLET, FILM COATED ORAL at 05:10

## 2021-11-28 RX ADMIN — LIDOCAINE 1 PATCH: 4 CREAM TOPICAL at 10:19

## 2021-11-28 RX ADMIN — Medication 975 MILLIGRAM(S): at 11:19

## 2021-11-28 RX ADMIN — OXYCODONE HYDROCHLORIDE 5 MILLIGRAM(S): 5 TABLET ORAL at 16:13

## 2021-11-28 RX ADMIN — METHOCARBAMOL 1500 MILLIGRAM(S): 500 TABLET, FILM COATED ORAL at 11:02

## 2021-11-28 RX ADMIN — OXYCODONE HYDROCHLORIDE 5 MILLIGRAM(S): 5 TABLET ORAL at 08:42

## 2021-11-28 RX ADMIN — OXYCODONE HYDROCHLORIDE 5 MILLIGRAM(S): 5 TABLET ORAL at 22:00

## 2021-11-28 RX ADMIN — OXYCODONE HYDROCHLORIDE 5 MILLIGRAM(S): 5 TABLET ORAL at 12:00

## 2021-11-28 RX ADMIN — POLYETHYLENE GLYCOL 3350 17 GRAM(S): 17 POWDER, FOR SOLUTION ORAL at 13:40

## 2021-11-28 RX ADMIN — OXYCODONE HYDROCHLORIDE 5 MILLIGRAM(S): 5 TABLET ORAL at 07:42

## 2021-11-28 RX ADMIN — OXYCODONE HYDROCHLORIDE 5 MILLIGRAM(S): 5 TABLET ORAL at 03:32

## 2021-11-28 RX ADMIN — GABAPENTIN 300 MILLIGRAM(S): 400 CAPSULE ORAL at 21:38

## 2021-11-28 RX ADMIN — HYDROMORPHONE HYDROCHLORIDE 1 MILLIGRAM(S): 2 INJECTION INTRAMUSCULAR; INTRAVENOUS; SUBCUTANEOUS at 09:02

## 2021-11-28 RX ADMIN — OXYCODONE HYDROCHLORIDE 5 MILLIGRAM(S): 5 TABLET ORAL at 11:00

## 2021-11-28 RX ADMIN — HYDROMORPHONE HYDROCHLORIDE 1 MILLIGRAM(S): 2 INJECTION INTRAMUSCULAR; INTRAVENOUS; SUBCUTANEOUS at 09:17

## 2021-11-28 RX ADMIN — HEPARIN SODIUM 5000 UNIT(S): 5000 INJECTION INTRAVENOUS; SUBCUTANEOUS at 21:38

## 2021-11-28 RX ADMIN — OXYCODONE HYDROCHLORIDE 5 MILLIGRAM(S): 5 TABLET ORAL at 21:38

## 2021-11-28 RX ADMIN — METHOCARBAMOL 1500 MILLIGRAM(S): 500 TABLET, FILM COATED ORAL at 18:07

## 2021-11-28 RX ADMIN — OXYCODONE HYDROCHLORIDE 5 MILLIGRAM(S): 5 TABLET ORAL at 04:13

## 2021-11-28 RX ADMIN — HEPARIN SODIUM 5000 UNIT(S): 5000 INJECTION INTRAVENOUS; SUBCUTANEOUS at 05:10

## 2021-11-28 NOTE — PROGRESS NOTE ADULT - ASSESSMENT
39 y/o male with lumbar radiculopathy 2/2 L L4-5 HNP  - consider starting gabapentin for neuropathic pain   - pain management for L L4-5 EDSI  - will continue to follow

## 2021-11-28 NOTE — PROGRESS NOTE ADULT - SUBJECTIVE AND OBJECTIVE BOX
Chief Complaint:  back pain    SUBJECTIVE / OVERNIGHT EVENTS: No acute overnight events reported.  Pt continues to require prn pain meds for pain control.  Pain still radiates down lower back to proximal LLE up to knee.  Patient denies paresthesias, fecal/urinary incontinence, inability to move extremities.  He also denies chest pain, SOB, abd pain, N/V, fever, chills, dysuria or any other complaints. All remainder ROS negative.       I&O's Summary        PHYSICAL EXAM:  Vital Signs Last 24 Hrs  T(C): 36.5 (2021 09:29), Max: 36.8 (2021 23:22)  T(F): 97.7 (2021 09:29), Max: 98.3 (2021 23:22)  HR: 65 (:29) (65 - 78)  BP: 112/60 (2021 09:29) (106/64 - 112/60)  BP(mean): --  RR: 19 (2021 09:29) (18 - 19)  SpO2: 93% (2021 09:29) (91% - 93%)      GENEARAL: pt examined bedside, appears uncomfortable in bed but in NAD  HEENT: NC/AT, moist oral mucosa, clear conjunctiva, sclera nonicteric  RESPIRATORY: Normal respiratory effort; CTA b/l, no wheezing, rhonchi, rales  CARDIOVASCULAR: RRR, normal S1 and S2, no murmur/rub/gallop  ABDOMEN: soft, NT/ND, normoactive bowel sounds, no rebound/guarding  MUSCLOSKELETAL:  no joint swelling or tenderness to palpation, unable to do straight leg test on left 2/2 pain, no spinal or paraspinal tenderness to palpation  EXTREMITIES: No cynaosis, no clubbing, no lower extremity edema; Peripheral pulses are 2+ bilaterally  PSYCH: affect appropriate and cooperative  NEUROLOGY: A+O to person, place, and time, no focal deficits   SKIN: No rashes or no palpable lesions        LABS:                                        15.0   9.51  )-----------( 412      ( 2021 08:25 )             44.6     11-    137  |  98  |  13.5  ----------------------------<  114<H>  4.5   |  23.0  |  0.73    Ca    9.4      2021 08:23        Urinalysis Basic - ( 2021 09:56 )    Color: Yellow / Appearance: Slightly Turbid / S.015 / pH: x  Gluc: x / Ketone: Moderate  / Bili: Negative / Urobili: 1 mg/dL   Blood: x / Protein: 30 mg/dL / Nitrite: Negative   Leuk Esterase: Negative / RBC: Negative /HPF / WBC Negative   Sq Epi: x / Non Sq Epi: Occasional / Bacteria: Negative        CAPILLARY BLOOD GLUCOSE            RADIOLOGY & ADDITIONAL TESTS:      < from: CT Lumbar Spine No Cont (21 @ 04:59) >  FINDINGS:    There is no acute lumbar spine fracture or evidence of traumatic malalignment. Preservation of the normal lumbar lordosis. Vertebral body hiatus are well-preserved. Mild intervertebral body disc height lossat L3-L4 and L5-S1. No aggressive osseous lesion. Visualized intra-abdominal contents are unremarkable. Paraspinal musculature is within normal limits.    Evaluation of the individual spinal levels demonstrate:    T12-L1: No disc herniation, spinal canal, or neuroforaminal compromise.    L1-L2: No disc herniation, spinal canal, or neuroforaminal compromise.    L2-L3: No disc herniation, spinal canal, or neuroforaminal compromise.    L3-L4: Diffuse disc bulge with superimposed osseous ridging. Mild left neuroforaminal narrowing. Mild canal stenosis.    L4-L5: Mild diffuse disc bulge. No significant neuroforaminal stenosis. Mild canal stenosis.    L5-S1: Diffuse disc bulge with superimposed osseous ridging contributing to moderate to severe right and moderate left neuroforaminal stenosis.    IMPRESSION:    No acute lumbar spine fracture or evidence of traumatic malalignment.    Multilevel lumbar spondylosis most prominent at the L5-S1 level contributing to moderate to severe right and moderate left neuroforaminal stenosis.    < end of copied text >        < from: MR Lumbar Spine No Cont (21 @ 18:30) >  FINDINGS:  ALIGNMENT:  Mild straightening is noted. Also there is a retrolisthesis at L4-5  VERTEBRAL BODIES:  No acute fracture or destructive lesion is identified.  DISC SPACES: Disc degeneration and disc space narrowing is noted from L3 to S1.  Lower Thoracic: An asymmetric bulge is noted at T11-T12 greater to the left.  L1-2:   Unremarkable  L2-3:   Unremarkable  L3-4:   A small broad-based disc bulge is noted more prevalent to the left. There is mild left foraminal prolapse  L4-5:  A left paracentral and posterolateral herniation is noted with an extruded fragment in the left ventral canal. There is left recess impingement.  L5-S1:   A small protrusion is noted containing within the ventral epidural fat. There is underlying slight retrolisthesis at this level as well.  POSTERIOR ELEMENTS:  Hypertrophic changes are noted at L4-5 and L5-S1  CANAL AND FORAMINA:  Most narrowed at L4-5 left greater than right  INTRADURAL SPACE:  No intradural abnormality.  The distal cord is unremarkable in contour and signal.  MISCELLANEOUS:     The prevertebral and paraspinal soft tissues are unremarkable.    IMPRESSION:      1. Moderate size extrusion indicative of a disc herniation to the left at L4-5 with underlying degenerative changes. Left recess impingement with underlying canal stenosis.  2. Bulging discs also noted at L3-4 and L5-S1 with degenerative changes.  3. No acute fracture identified.    < end of copied text >      MEDICATIONS  (STANDING):  lidocaine   4% Patch 1 Patch Transdermal every 24 hours  methocarbamol 1500 milliGRAM(s) Oral every 6 hours  oxycodone    5 mG/acetaminophen 325 mG 1 Tablet(s) Oral every 6 hours  senna 2 Tablet(s) Oral at bedtime    MEDICATIONS  (PRN):  HYDROmorphone  Injectable 1 milliGRAM(s) IV Push every 6 hours PRN Severe Pain (7 - 10)

## 2021-11-28 NOTE — PROGRESS NOTE ADULT - ASSESSMENT
39 y/o Palauan speaking M with no significant PMH comes in c/o of intractable lower back pain that radiates down his left leg x 5 days prior to admission.  Pt has had back discomfort in the past but never like this.  Pain started after lifting heavy object.  He denies urinary/fecal.  MRI reporting impingement.  Placed on steroids and neurosurgery consulted.  Pain still uncontrolled.       Intractable back pain   - Pain remains uncontrolled despite pain regimen; will consult pain management for further recs   - CT lumbar spine reviewed and noted above; significant for multilevel lumbar spondylosis most prominent at L5-S1 contributing to mod/severe rt and mod L-neuroforaminal stenosis    - MRI lumbar spine reviewed and noted above; significant for left L4-5 disc herniation w/ underlying degenerative changes and left recess impingement w/ underlying canal stenosis    - PRN analgesics and muscle relaxers   - Started on gabapentin   - Maintain on decadron   - PRN stool softeners while on opiate regimen   - PT consult will be ordered once neurosurgery clears and pain is better controlled   - Neurosurgery following       VTE ppx: heparin SQ    Dispo: pending clinical course.

## 2021-11-28 NOTE — PROGRESS NOTE ADULT - SUBJECTIVE AND OBJECTIVE BOX
40M who presented with back pain. The patient reported that he worked as a  and hurt his back about five days prior while lifting something heavy. The pain was initially mild in severity but continued to worsen everyday and the patient presented to the hospital when the pain was to the point that he was unable to ambulate. He reported a history of back pain in the past but was never to this severity. He would wear a back brace which had previously helped but provided no relief on this occasion. The patient was evaluated in the emergency department and initially placed under observational status. Various analgesic medications were administered without significant improvement and the patient continued to have significant back pain and was thought to require admission to the hospital for further management. The patient reported that he was otherwise in his usual stated of health and was without fevers, chills, cough, or recent sick contacts. (2021 15:29)      INTERVAL HPI/OVERNIGHT EVENTS:  Still with severe back and left leg pain     Vital Signs Last 24 Hrs  T(C): 36.8 (2021 03:29), Max: 36.8 (2021 23:22)  T(F): 98.2 (2021 03:29), Max: 98.3 (2021 23:22)  HR: 76 (2021 03:29) (76 - 78)  BP: 110/64 (2021 03:29) (106/63 - 110/64)  BP(mean): --  RR: 18 (2021 03:29) (18 - 18)  SpO2: 91% (2021 03:29) (91% - 98%)    PHYSICAL EXAM:  GENERAL: laying in bed in significant pain, well-groomed, well-developed  Awake, alert and oriented, ANDRES x4   LLE limited 2/2 pain, RLE 5/5   Following commands briskly   CHEST/LUNG: Clear to auscultation bilaterally  HEART: +S1/+S2; Regular rate and rhythm  ABDOMEN: Soft, nontender, nondistended  EXTREMITIES:  2+ peripheral pulses  SKIN: Warm, dry    LABS:                        15.0   9.51  )-----------( 412      ( 2021 08:25 )             44.6             Urinalysis Basic - ( 2021 09:56 )    Color: Yellow / Appearance: Slightly Turbid / S.015 / pH: x  Gluc: x / Ketone: Moderate  / Bili: Negative / Urobili: 1 mg/dL   Blood: x / Protein: 30 mg/dL / Nitrite: Negative   Leuk Esterase: Negative / RBC: Negative /HPF / WBC Negative   Sq Epi: x / Non Sq Epi: Occasional / Bacteria: Negative      CAPRINI SCORE [CLOT]:  Patient has an estimated Caprini score of greater than 5.  However, the patient's unique clinical situation will be addressed in an individual manner to determine appropriate anticoagulation treatment, if any.

## 2021-11-29 VITALS
TEMPERATURE: 98 F | SYSTOLIC BLOOD PRESSURE: 120 MMHG | HEART RATE: 64 BPM | RESPIRATION RATE: 18 BRPM | OXYGEN SATURATION: 98 % | DIASTOLIC BLOOD PRESSURE: 76 MMHG

## 2021-11-29 LAB
ANION GAP SERPL CALC-SCNC: 13 MMOL/L — SIGNIFICANT CHANGE UP (ref 5–17)
BUN SERPL-MCNC: 16.4 MG/DL — SIGNIFICANT CHANGE UP (ref 8–20)
CALCIUM SERPL-MCNC: 9.1 MG/DL — SIGNIFICANT CHANGE UP (ref 8.6–10.2)
CHLORIDE SERPL-SCNC: 99 MMOL/L — SIGNIFICANT CHANGE UP (ref 98–107)
CO2 SERPL-SCNC: 24 MMOL/L — SIGNIFICANT CHANGE UP (ref 22–29)
CREAT SERPL-MCNC: 0.8 MG/DL — SIGNIFICANT CHANGE UP (ref 0.5–1.3)
GLUCOSE SERPL-MCNC: 99 MG/DL — SIGNIFICANT CHANGE UP (ref 70–99)
HCT VFR BLD CALC: 42.6 % — SIGNIFICANT CHANGE UP (ref 39–50)
HGB BLD-MCNC: 14 G/DL — SIGNIFICANT CHANGE UP (ref 13–17)
MCHC RBC-ENTMCNC: 28 PG — SIGNIFICANT CHANGE UP (ref 27–34)
MCHC RBC-ENTMCNC: 32.9 GM/DL — SIGNIFICANT CHANGE UP (ref 32–36)
MCV RBC AUTO: 85.2 FL — SIGNIFICANT CHANGE UP (ref 80–100)
PLATELET # BLD AUTO: 395 K/UL — SIGNIFICANT CHANGE UP (ref 150–400)
POTASSIUM SERPL-MCNC: 3.6 MMOL/L — SIGNIFICANT CHANGE UP (ref 3.5–5.3)
POTASSIUM SERPL-SCNC: 3.6 MMOL/L — SIGNIFICANT CHANGE UP (ref 3.5–5.3)
RBC # BLD: 5 M/UL — SIGNIFICANT CHANGE UP (ref 4.2–5.8)
RBC # FLD: 12.7 % — SIGNIFICANT CHANGE UP (ref 10.3–14.5)
SODIUM SERPL-SCNC: 136 MMOL/L — SIGNIFICANT CHANGE UP (ref 135–145)
WBC # BLD: 6.76 K/UL — SIGNIFICANT CHANGE UP (ref 3.8–10.5)
WBC # FLD AUTO: 6.76 K/UL — SIGNIFICANT CHANGE UP (ref 3.8–10.5)

## 2021-11-29 PROCEDURE — 96375 TX/PRO/DX INJ NEW DRUG ADDON: CPT

## 2021-11-29 PROCEDURE — 97530 THERAPEUTIC ACTIVITIES: CPT

## 2021-11-29 PROCEDURE — 97116 GAIT TRAINING THERAPY: CPT

## 2021-11-29 PROCEDURE — 85025 COMPLETE CBC W/AUTO DIFF WBC: CPT

## 2021-11-29 PROCEDURE — U0005: CPT

## 2021-11-29 PROCEDURE — 80053 COMPREHEN METABOLIC PANEL: CPT

## 2021-11-29 PROCEDURE — 99285 EMERGENCY DEPT VISIT HI MDM: CPT | Mod: 25

## 2021-11-29 PROCEDURE — 81001 URINALYSIS AUTO W/SCOPE: CPT

## 2021-11-29 PROCEDURE — 72131 CT LUMBAR SPINE W/O DYE: CPT | Mod: MA

## 2021-11-29 PROCEDURE — 72148 MRI LUMBAR SPINE W/O DYE: CPT | Mod: MA

## 2021-11-29 PROCEDURE — 36415 COLL VENOUS BLD VENIPUNCTURE: CPT

## 2021-11-29 PROCEDURE — U0003: CPT

## 2021-11-29 PROCEDURE — 99232 SBSQ HOSP IP/OBS MODERATE 35: CPT

## 2021-11-29 PROCEDURE — 99239 HOSP IP/OBS DSCHRG MGMT >30: CPT

## 2021-11-29 PROCEDURE — 96374 THER/PROPH/DIAG INJ IV PUSH: CPT

## 2021-11-29 PROCEDURE — 86769 SARS-COV-2 COVID-19 ANTIBODY: CPT

## 2021-11-29 PROCEDURE — 85027 COMPLETE CBC AUTOMATED: CPT

## 2021-11-29 PROCEDURE — 80048 BASIC METABOLIC PNL TOTAL CA: CPT

## 2021-11-29 RX ORDER — METHOCARBAMOL 500 MG/1
2 TABLET, FILM COATED ORAL
Qty: 42 | Refills: 0
Start: 2021-11-29 | End: 2021-12-05

## 2021-11-29 RX ORDER — OXYCODONE HYDROCHLORIDE 5 MG/1
1 TABLET ORAL
Qty: 21 | Refills: 0
Start: 2021-11-29 | End: 2021-12-05

## 2021-11-29 RX ORDER — GABAPENTIN 400 MG/1
1 CAPSULE ORAL
Qty: 90 | Refills: 0
Start: 2021-11-29 | End: 2021-12-28

## 2021-11-29 RX ADMIN — OXYCODONE HYDROCHLORIDE 5 MILLIGRAM(S): 5 TABLET ORAL at 14:43

## 2021-11-29 RX ADMIN — METHOCARBAMOL 1500 MILLIGRAM(S): 500 TABLET, FILM COATED ORAL at 00:52

## 2021-11-29 RX ADMIN — GABAPENTIN 300 MILLIGRAM(S): 400 CAPSULE ORAL at 14:43

## 2021-11-29 RX ADMIN — OXYCODONE HYDROCHLORIDE 5 MILLIGRAM(S): 5 TABLET ORAL at 01:30

## 2021-11-29 RX ADMIN — HEPARIN SODIUM 5000 UNIT(S): 5000 INJECTION INTRAVENOUS; SUBCUTANEOUS at 05:40

## 2021-11-29 RX ADMIN — OXYCODONE HYDROCHLORIDE 5 MILLIGRAM(S): 5 TABLET ORAL at 00:52

## 2021-11-29 RX ADMIN — METHOCARBAMOL 1500 MILLIGRAM(S): 500 TABLET, FILM COATED ORAL at 05:40

## 2021-11-29 RX ADMIN — OXYCODONE HYDROCHLORIDE 5 MILLIGRAM(S): 5 TABLET ORAL at 05:40

## 2021-11-29 RX ADMIN — METHOCARBAMOL 1500 MILLIGRAM(S): 500 TABLET, FILM COATED ORAL at 11:20

## 2021-11-29 RX ADMIN — GABAPENTIN 300 MILLIGRAM(S): 400 CAPSULE ORAL at 05:39

## 2021-11-29 RX ADMIN — OXYCODONE HYDROCHLORIDE 5 MILLIGRAM(S): 5 TABLET ORAL at 11:19

## 2021-11-29 RX ADMIN — LIDOCAINE 1 PATCH: 4 CREAM TOPICAL at 11:19

## 2021-11-29 RX ADMIN — HEPARIN SODIUM 5000 UNIT(S): 5000 INJECTION INTRAVENOUS; SUBCUTANEOUS at 14:43

## 2021-11-29 NOTE — DISCHARGE NOTE PROVIDER - CARE PROVIDER_API CALL
Kristi Aguilar (MD)  Anesthesiology; Pain Medicine  [address] [facility] [place] [address]  Phone: [phone]  Fax: [phone]  Follow Up Time: 2 weeks    Eric Salgado; PhD)  Neurosurgery  [address] [place] [address]  Phone: [phone]  Fax: [phone]  Follow Up Time: Routine

## 2021-11-29 NOTE — DISCHARGE NOTE NURSING/CASE MANAGEMENT/SOCIAL WORK - PATIENT PORTAL LINK FT
You can access the FollowMyHealth Patient Portal offered by [facility] by registering at the following website: [email or web address]. By joining [facility]’s FollowMyHealth portal, you will also be able to view your health information using other applications (apps) compatible with our system.

## 2021-11-29 NOTE — DISCHARGE NOTE PROVIDER - NSDCMRMEDTOKEN_GEN_ALL_CORE_FT
gabapentin 300 mg oral capsule: 1 cap(s) orally 3 times a day  methocarbamol 750 mg oral tablet: 2 tab(s) orally 3 times a day   oxyCODONE 5 mg oral tablet: 1 tab(s) orally every 8 hours MDD:3 tabs

## 2021-11-29 NOTE — DISCHARGE NOTE PROVIDER - NSDCFUADDINST_GEN_ALL_CORE_FT
Avoid moderate-heavy straining for the next week. Can reintroduce moderate activity as pain allows. Wear an abdominal brace while at work.

## 2021-11-29 NOTE — PROGRESS NOTE ADULT - SUBJECTIVE AND OBJECTIVE BOX
HPI:  40 year old Bengali speaking Male ( used bedside,[id number]) w/ no PMHX, does not have PCP, presents to ED after worsening back pain, mostly in L lumbar/buttock region, radiating down L posterior aspect of thigh, stopping at knee. While lifting a heavy object ~1 week ago, he started having this back pain. Hx of back pain but unlike current episode, has never seen doctor regarding pain, purchased back brace OTC. Denies bladder/bowel incontinence, saddle anesthesia, headache, nausea, vomiting. Urinating fine, has not had BM since 11/24 pt states he hasn't had the appetite d/t severe pain. Neuro exam intact, however LLE + straight leg raise. Pt preferring prone for comfort.     INTERVAL HPI/OVERNIGHT EVENTS:  Patient seen and examined by neurosurgical team.     Vital Signs Last 24 Hrs  T(C): 36.7 (29 Nov 2021 16:00), Max: 36.8 (28 Nov 2021 20:33)  T(F): 98 (29 Nov 2021 16:00), Max: 98.3 (28 Nov 2021 20:33)  HR: 64 (29 Nov 2021 16:00) (60 - 81)  BP: 120/76 (29 Nov 2021 16:00) (119/67 - 127/86)  BP(mean): --  RR: 18 (29 Nov 2021 16:00) (18 - 18)  SpO2: 98% (29 Nov 2021 16:00) (92% - 98%)    PHYSICAL EXAM:  GENERAL: NAD, well-groomed, well-developed  HEAD:  Atraumatic, normocephalic  DANNI COMA SCORE: E-4 V-5 M-6 = 15       E: 4= opens eyes spontaneously 3= to voice 2= to noxious 1= no opening       V: 5= oriented 4= confused 3= inappropriate words 2= incomprehensible sounds 1= nonverbal 1T= intubated       M: 6= follows commands 5= localizes 4= withdraws 3= flexor posturing 2= extensor posturing 1= no movement  MENTAL STATUS: AAO x3; Awake; Opens eyes spontaneously. Appropriately conversant without aphasia; following simple commands  CRANIAL NERVES: Visual acuity normal for age, EOMI without nystagmus. Face symmetric w/ normal eye closure and smile, tongue midline. Hearing grossly intact. Speech clear.   MOTOR: strength 5/5 b/l upper and lower extremities. LLE HF 5/5 however, + straight leg raise  Uppers     Delt (C5/6)     Bicep (C5/6)     Wrist Extend (C6)     Tricep (C7)     HG (C8/T1)  R                     5/5                 5/5                         5/5                           5/5                   5/5  L                      5/5                 5/5                         5/5                           5/5                   5/5  Lowers      HF(L1/L2)     KE (L3)     DF (L4)     EHL (L5)     PF (S1)      R                     5/5              5/5           5/5           5/5            5/5  L                     5/5               5/5          5/5            5/5            5/5  SENSATION: grossly intact to light touch all extremities  MUSCLE STRETCH REFLEXES: DTRs 2+ intact and symmetric in b/l LE.   CHEST/LUNG: Nonlabored breaths  SKIN: Warm, dry      LABS:             14.0   6.76  )-----------( 395      ( 29 Nov 2021 08:08 )             42.6     11-29    136  |  99  |  16.4  ----------------------------<  99  3.6   |  24.0  |  0.80    Ca    9.1      29 Nov 2021 08:08    11-28 @ 07:01  -  11-29 @ 07:00  --------------------------------------------------------  IN: 200 mL / OUT: 250 mL / NET: -50 mL          RADIOLOGY & ADDITIONAL STUDIES:  CT Lumbar Spine No Cont (11.26.21 @ 04:59)   IMPRESSION:  No acute lumbar spine fracture or evidence of traumatic malalignment.  Multilevel lumbar spondylosis most prominent at the L5-S1 level contributing to moderate to severe right and moderate left neuroforaminal stenosis.    MR Lumbar Spine No Cont (11.26.21 @ 18:30)   IMPRESSION:  1. Moderate size extrusion indicative of a disc herniation to the left at L4-5 with underlying degenerative changes. Left recess impingement with underlying canal stenosis.  2. Bulging discs also noted at L3-4 and L5-S1 with degenerative changes.  3. No acute fracture identified. HPI:  40 year old German speaking Male ( used bedside,[id number]) w/ no PMHX, does not have PCP, presents to ED after worsening back pain, mostly in L lumbar/buttock region, radiating down L posterior aspect of thigh, stopping at knee. While lifting a heavy object ~1 week ago, he started having this back pain. Hx of back pain but unlike current episode, has never seen doctor regarding pain, purchased back brace OTC. Denies bladder/bowel incontinence, saddle anesthesia, headache, nausea, vomiting. Urinating fine, has not had BM since 11/24 pt states he hasn't had the appetite d/t severe pain. Neuro exam intact, however LLE + straight leg raise. Pt preferring prone for comfort.     INTERVAL HPI/OVERNIGHT EVENTS:  Patient attempted to be seen this afternoon but has been discharged. Still pending pain management recommendations     Vital Signs Last 24 Hrs  T(C): 36.7 (29 Nov 2021 16:00), Max: 36.8 (28 Nov 2021 20:33)  T(F): 98 (29 Nov 2021 16:00), Max: 98.3 (28 Nov 2021 20:33)  HR: 64 (29 Nov 2021 16:00) (60 - 81)  BP: 120/76 (29 Nov 2021 16:00) (119/67 - 127/86)  BP(mean): --  RR: 18 (29 Nov 2021 16:00) (18 - 18)  SpO2: 98% (29 Nov 2021 16:00) (92% - 98%)    PHYSICAL EXAM:  Discharged     LABS:             14.0   6.76  )-----------( 395      ( 29 Nov 2021 08:08 )             42.6     11-29    136  |  99  |  16.4  ----------------------------<  99  3.6   |  24.0  |  0.80    Ca    9.1      29 Nov 2021 08:08    11-28 @ 07:01  -  11-29 @ 07:00  --------------------------------------------------------  IN: 200 mL / OUT: 250 mL / NET: -50 mL          RADIOLOGY & ADDITIONAL STUDIES:  CT Lumbar Spine No Cont (11.26.21 @ 04:59)   IMPRESSION:  No acute lumbar spine fracture or evidence of traumatic malalignment.  Multilevel lumbar spondylosis most prominent at the L5-S1 level contributing to moderate to severe right and moderate left neuroforaminal stenosis.    MR Lumbar Spine No Cont (11.26.21 @ 18:30)   IMPRESSION:  1. Moderate size extrusion indicative of a disc herniation to the left at L4-5 with underlying degenerative changes. Left recess impingement with underlying canal stenosis.  2. Bulging discs also noted at L3-4 and L5-S1 with degenerative changes.  3. No acute fracture identified.

## 2021-11-29 NOTE — PROGRESS NOTE ADULT - ASSESSMENT
40 year old Sami speaking Male ( used bedside,[id number]) w/ no PMHX, does not have PCP, presents to ED after worsening back pain, mostly in L lumbar/buttock region, radiating down L posterior aspect of thigh, stopping at knee. While lifting a heavy object ~1 week ago, he started having this back pain. Hx of back pain but unlike current episode, has never seen doctor regarding pain, purchased back brace OTC. Denies bladder/bowel incontinence, saddle anesthesia, headache, nausea, vomiting. Neuro exam intact, however LLE + straight leg raise. Pt preferring prone for comfort.           Plan  - Imaging reviewed  - Q4 neuro checks while in house, monitor for any weakness   - Pain control PRN; Tylenol 650q6, Oxy 5/10q4  - Decadron 10 x1 for pain comfort  - Pain management consult; pending recs  - D/w Dr. Salgado  40 year old Danish speaking Male ( used bedside,[id number]) w/ no PMHX, does not have PCP, presents to ED after worsening back pain, mostly in L lumbar/buttock region, radiating down L posterior aspect of thigh, stopping at knee. While lifting a heavy object ~1 week ago, he started having this back pain. Hx of back pain but unlike current episode, has never seen doctor regarding pain, purchased back brace OTC. Denies bladder/bowel incontinence, saddle anesthesia, headache, nausea, vomiting. Neuro exam intact, however LLE + straight leg raise. Pt preferring prone for comfort.           Plan  - Imaging reviewed  - Q4 neuro checks while in house, monitor for any weakness   - Pain control PRN; Tylenol 650q6, Oxy 5/10q4  - Pain management consult; pending recs  - Patient discharged prior to recommendations  - Please follow up with Dr. Salgado in 1 week or return to Ed for worsening/severe symptoms   - D/w Dr. Salgado

## 2021-11-29 NOTE — DISCHARGE NOTE PROVIDER - PROVIDER TOKENS
PROVIDER:[TOKEN:[[id number]],FOLLOWUP:[2 weeks]],PROVIDER:[TOKEN:[[id number]],FOLLOWUP:[Routine]]

## 2021-11-29 NOTE — CONSULT NOTE ADULT - SUBJECTIVE AND OBJECTIVE BOX
CC: back pain     HPI: per chart review:   39 y/o Barbadian speaking M with no significant PMH comes in c/o of intractable lower back pain that radiates down his left leg x 5 days prior to admission.  Pt has had back discomfort in the past but never like this.  Pain started after lifting heavy object.  He denies urinary/fecal.  MRI reporting impingement.  Placed on steroids and neurosurgery consulted.< from: MR Lumbar Spine No Cont (11.26.21 @ 18:30) >  L1-2:   Unremarkable  L2-3:   Unremarkable  L3-4:   A small broad-based disc bulge is noted more prevalent to the left. There is mild left foraminal prolapse  L4-5:  A left paracentral and posterolateral herniation is noted with an extruded fragment in the left ventral canal. There is left recess impingement.  L5-S1:   A small protrusion is noted containing within the ventral epidural fat. There is underlying slight retrolisthesis at this level as well.    < end of copied text >      .seen   T(C): 36.8 (11-29-21 @ 05:42), Max: 36.8 (11-28-21 @ 20:33)  HR: 81 (11-29-21 @ 05:42) (65 - 85)  BP: 127/86 (11-29-21 @ 05:42) (112/60 - 138/93)  RR: 18 (11-29-21 @ 05:42) (18 - 19)  SpO2: 97% (11-29-21 @ 05:42) (93% - 97%)      11-28-21 @ 07:01  -  11-29-21 @ 07:00  --------------------------------------------------------  IN: 200 mL / OUT: 250 mL / NET: -50 mL        acetaminophen     Tablet .. 975 milliGRAM(s) Oral every 8 hours PRN  gabapentin 300 milliGRAM(s) Oral three times a day  heparin   Injectable 5000 Unit(s) SubCutaneous every 8 hours  HYDROmorphone  Injectable 1 milliGRAM(s) IV Push every 6 hours PRN  lidocaine   4% Patch 1 Patch Transdermal every 24 hours  methocarbamol 1500 milliGRAM(s) Oral every 6 hours  oxyCODONE    IR 5 milliGRAM(s) Oral every 4 hours  polyethylene glycol 3350 17 Gram(s) Oral two times a day PRN  senna 2 Tablet(s) Oral at bedtime                          15.0   9.51  )-----------( 412      ( 28 Nov 2021 08:25 )             44.6     11-28    137  |  98  |  13.5  ----------------------------<  114<H>  4.5   |  23.0  |  0.73    Ca    9.4      28 Nov 2021 08:23            Pain Service   [phone]

## 2021-11-29 NOTE — DISCHARGE NOTE PROVIDER - HOSPITAL COURSE
41 y/o French speaking M with no significant PMH comes in c/o of intractable lower back pain that radiates down his left leg x 5 days prior to admission.  Pt has had back discomfort in the past but never like this.  Pain started after lifting heavy object.  He denies urinary/fecal.    Placed on steroids and neurosurgery consulted.  Started on gabapentin CT lumbar spine reviewed and noted above; significant for multilevel lumbar spondylosis most prominent at L5-S1 contributing to mod/severe rt and mod L-neuroforaminal stenosis.  MRI lumbar spine significant for left L4-5 disc herniation w/ underlying degenerative changes and left recess impingement w/ underlying canal stenosis.  Pain still uncontrolled. Pain management consulted.  PRN analgesics and muscle relaxers.  PT consult will be ordered once neurosurgery clears and pain is better controlled.     Vital Signs Last 24 Hrs  T(C): 36.8 (29 Nov 2021 05:42), Max: 36.8 (28 Nov 2021 20:33)  T(F): 98.3 (29 Nov 2021 05:42), Max: 98.3 (28 Nov 2021 20:33)  HR: 81 (29 Nov 2021 05:42) (78 - 85)  BP: 127/86 (29 Nov 2021 05:42) (119/67 - 138/93)  RR: 18 (29 Nov 2021 05:42) (18 - 18)  SpO2: 97% (29 Nov 2021 05:42) (95% - 97%)    ROS:  No acute overnight events reported.  Pt continues to require prn pain meds for pain control.  Pain still radiates down lower back to proximal LLE up to knee.  Patient denies paresthesias, fecal/urinary incontinence, inability to move extremities.   All remainder ROS negative.     PHYSICAL EXAM:  GENEARAL: pt examined bedside, appears uncomfortable in bed but in NAD  RESPIRATORY: Normal respiratory effort; CTA b/l, no wheezing, rhonchi, rales  CARDIOVASCULAR: RRR, normal S1 and S2, no murmur/rub/gallop  ABDOMEN: soft, NT/ND, normoactive bowel sounds, no rebound/guarding  MUSCLOSKELETAL:  no joint swelling or tenderness to palpation, unable to do straight leg test on left 2/2 pain, no spinal or paraspinal tenderness to palpation  EXTREMITIES: No cyanosis, no clubbing, no lower extremity edema; Peripheral pulses are 2+ bilaterally    Length of the discharge time: 35minutes 39 y/o Kinyarwanda speaking M with no significant PMH comes in c/o of intractable lower back pain that radiates down his left leg x 5 days prior to admission.  Pt has had back discomfort in the past but never like this.  Pain started after lifting heavy object.  He denies urinary/fecal.    Placed on steroids and neurosurgery consulted.  Started on gabapentin CT lumbar spine reviewed and noted above; significant for multilevel lumbar spondylosis most prominent at L5-S1 contributing to mod/severe rt and mod L-neuroforaminal stenosis.  MRI lumbar spine significant for left L4-5 disc herniation w/ underlying degenerative changes and left recess impingement w/ underlying canal stenosis.  Pain still uncontrolled. Pain management consulted.  PRN analgesics and muscle relaxers.  PT consult will be ordered once neurosurgery clears and pain is better controlled.     Attending Attestation:  I personally saw and evaluated the patient and agree with the above assessment and plan    Vital Signs Last 24 Hrs  T(C): 36.8 (29 Nov 2021 05:42), Max: 36.8 (28 Nov 2021 20:33)  T(F): 98.3 (29 Nov 2021 05:42), Max: 98.3 (28 Nov 2021 20:33)  HR: 81 (29 Nov 2021 05:42) (78 - 85)  BP: 127/86 (29 Nov 2021 05:42) (119/67 - 138/93)  RR: 18 (29 Nov 2021 05:42) (18 - 18)  SpO2: 97% (29 Nov 2021 05:42) (95% - 97%)    PHYSICAL EXAM:  GENEARAL: pt examined bedside, appears uncomfortable in bed but in NAD  RESPIRATORY: Normal respiratory effort; CTA b/l, no wheezing, rhonchi, rales  CARDIOVASCULAR: RRR, normal S1 and S2, no murmur/rub/gallop  ABDOMEN: soft, NT/ND, normoactive bowel sounds, no rebound/guarding  MUSCLOSKELETAL:  no joint swelling or tenderness to palpation, unable to do straight leg test on left 2/2 pain, no spinal or paraspinal tenderness to palpation  EXTREMITIES: No cyanosis, no clubbing, no lower extremity edema; Peripheral pulses are 2+ bilaterally    Length of the discharge time: 35minutes

## 2021-11-29 NOTE — DISCHARGE NOTE PROVIDER - NSDCCPCAREPLAN_GEN_ALL_CORE_FT
PRINCIPAL DISCHARGE DIAGNOSIS  Diagnosis: Lower back pain  Assessment and Plan of Treatment:        PRINCIPAL DISCHARGE DIAGNOSIS  Diagnosis: Lumbar herniated disc  Assessment and Plan of Treatment: in L4-L5 disc space. May ultimately require surgery. Patient should follow up with neurosurgery on discharge. continue with pain medications as prescribed. Avoid heavy lifting for the next week. Wear a brace while at work. Reintroduce moderate exertion as tolerating.